# Patient Record
Sex: FEMALE | Race: WHITE | NOT HISPANIC OR LATINO | Employment: FULL TIME | RURAL
[De-identification: names, ages, dates, MRNs, and addresses within clinical notes are randomized per-mention and may not be internally consistent; named-entity substitution may affect disease eponyms.]

---

## 2023-05-04 ENCOUNTER — LAB (OUTPATIENT)
Dept: LAB | Facility: HOSPITAL | Age: 33
End: 2023-05-04
Payer: COMMERCIAL

## 2023-05-04 ENCOUNTER — OFFICE VISIT (OUTPATIENT)
Dept: FAMILY MEDICINE CLINIC | Facility: CLINIC | Age: 33
End: 2023-05-04
Payer: COMMERCIAL

## 2023-05-04 VITALS
WEIGHT: 102 LBS | HEIGHT: 63 IN | TEMPERATURE: 98.6 F | OXYGEN SATURATION: 98 % | HEART RATE: 75 BPM | BODY MASS INDEX: 18.07 KG/M2 | SYSTOLIC BLOOD PRESSURE: 124 MMHG | DIASTOLIC BLOOD PRESSURE: 72 MMHG

## 2023-05-04 DIAGNOSIS — Z11.59 NEED FOR HEPATITIS C SCREENING TEST: ICD-10-CM

## 2023-05-04 DIAGNOSIS — Z13.220 SCREENING FOR HYPERLIPIDEMIA: ICD-10-CM

## 2023-05-04 DIAGNOSIS — Z00.00 ENCOUNTER FOR MEDICAL EXAMINATION TO ESTABLISH CARE: ICD-10-CM

## 2023-05-04 DIAGNOSIS — Z13.1 SCREENING FOR DIABETES MELLITUS: ICD-10-CM

## 2023-05-04 DIAGNOSIS — Z13.6 SCREENING FOR CARDIOVASCULAR CONDITION: ICD-10-CM

## 2023-05-04 DIAGNOSIS — Z13.21 ENCOUNTER FOR VITAMIN DEFICIENCY SCREENING: ICD-10-CM

## 2023-05-04 DIAGNOSIS — L70.9 ACNE, UNSPECIFIED ACNE TYPE: Primary | ICD-10-CM

## 2023-05-04 DIAGNOSIS — Z13.29 SCREENING FOR THYROID DISORDER: ICD-10-CM

## 2023-05-04 PROCEDURE — 80061 LIPID PANEL: CPT

## 2023-05-04 PROCEDURE — 80050 GENERAL HEALTH PANEL: CPT

## 2023-05-04 PROCEDURE — 83036 HEMOGLOBIN GLYCOSYLATED A1C: CPT

## 2023-05-04 PROCEDURE — 86803 HEPATITIS C AB TEST: CPT

## 2023-05-04 PROCEDURE — 82306 VITAMIN D 25 HYDROXY: CPT

## 2023-05-04 RX ORDER — SPIRONOLACTONE 25 MG/1
25 TABLET ORAL DAILY
Qty: 90 TABLET | Refills: 3 | Status: SHIPPED | OUTPATIENT
Start: 2023-05-04

## 2023-05-04 RX ORDER — SPIRONOLACTONE 25 MG/1
25 TABLET ORAL DAILY
COMMUNITY
End: 2023-05-04 | Stop reason: SDUPTHER

## 2023-05-04 NOTE — PROGRESS NOTES
"Chief Complaint  Establish Care    Subjective          Rosanna Flynn presents to [facility] [place]    History reviewed. No pertinent past medical history.   Past Surgical History:   Procedure Laterality Date   • BREAST AUGMENTATION Bilateral    • WISDOM TOOTH EXTRACTION          Current Outpatient Medications   Medication Instructions   • spironolactone (ALDACTONE) 25 mg, Oral, Daily      No Known Allergies     History of Present Illness  33-year-old female presents office to establish care.  Patient reports no recent previous PCP.  Last preventative care was received with  care around 7 years ago with birth of child's when living in [place], [place].  Patient reports no recent Pap.  Denies IUD use, denies any abnormal Paps in the past. The following portions of the patient's history were reviewed and updated as appropriate: allergies, current medications, past family history, past medical history, past social history, past surgical history and problem list.      Review of Systems   Constitutional: Negative.  Negative for chills and fever.   HENT: Negative.    Eyes: Negative.    Respiratory: Negative.  Negative for shortness of breath.    Cardiovascular: Negative.  Negative for chest pain, palpitations and leg swelling.   Gastrointestinal: Negative.  Negative for constipation and diarrhea.   Endocrine: Negative.    Musculoskeletal: Negative.    Skin: Negative.    Neurological: Negative.    Psychiatric/Behavioral: Negative.         Objective   Vital Signs:   /72 (BP Location: Right arm, Patient Position: Sitting, Cuff Size: Adult)   Pulse 75 Comment: apical  Temp 98.6 °F (37 °C)   Ht 160 cm (63\")   Wt 46.3 kg (102 lb)   SpO2 98%   BMI 18.07 kg/m²       Physical Exam  Vitals reviewed.   Constitutional:       General: She is not in acute distress.     Appearance: Normal appearance. She is not ill-appearing.   HENT:      Head: " Normocephalic and atraumatic.      Right Ear: External ear normal.      Left Ear: External ear normal.      Nose: Nose normal.   Eyes:      General:         Right eye: No discharge.         Left eye: No discharge.   Cardiovascular:      Rate and Rhythm: Normal rate and regular rhythm.      Heart sounds: Normal heart sounds.   Pulmonary:      Effort: Pulmonary effort is normal. No respiratory distress.      Breath sounds: Normal breath sounds. No stridor. No wheezing, rhonchi or rales.   Abdominal:      General: There is no distension.      Palpations: Abdomen is soft.   Musculoskeletal:         General: Normal range of motion.      Cervical back: Normal range of motion and neck supple.      Right lower leg: No edema.      Left lower leg: No edema.   Skin:     General: Skin is warm and dry.   Neurological:      General: No focal deficit present.      Mental Status: She is alert and oriented to person, place, and time. Mental status is at baseline.      Gait: Gait normal.   Psychiatric:         Mood and Affect: Mood normal.         Behavior: Behavior normal.         Thought Content: Thought content normal.         Judgment: Judgment normal.          Result Review :   The following data was reviewed by: ALLISON Silva on 05/04/2023:  Labs ordered this visit.         Assessment and Plan    Diagnoses and all orders for this visit:    1. Acne, unspecified acne type (Primary)  -     spironolactone (ALDACTONE) 25 MG tablet; Take 1 tablet by mouth Daily.  Dispense: 90 tablet; Refill: 3    2. Encounter for medical examination to establish care    3. Need for hepatitis C screening test  -     HCV Antibody Rfx To Qnt PCR  -     Interpretation:    4. Encounter for vitamin deficiency screening  -     Vitamin D,25-Hydroxy    5. Screening for hyperlipidemia  -     Lipid Panel    6. Screening for diabetes mellitus  -     Comprehensive Metabolic Panel  -     Hemoglobin A1c    7. Screening for cardiovascular condition  -      CBC & Differential  -     Comprehensive Metabolic Panel    8. Screening for thyroid disorder  -     TSH Rfx On Abnormal To Free T4      -Screening labs; will notify patient of results as available.  -Prescription for spironolactone for acne.  -Follow up in 2 months for Pap and annual physical.  -Vaccines: covid vaccine deferred at this time.  -Discussed frequent well balanced meals as well as adequate water intake.    -Discussed plan of care with patient.    -Answered all of patient's questions.  -Patient agreeable to plan of care.    EMR Dragon/Transcription Disclaimer: Some of this note may be an electronic transcription/translation of spoken language to printed text.  The electronic translation of spoken language may permit erroneous, or at times, nonsensical words or phrases to be inadvertently transcribed. Although I have reviewed the note for such errors, some may still exist.     Follow Up   Return in about 2 months (around 7/4/2023) for Annual physical & pap.  Patient was given instructions and counseling regarding her condition or for health maintenance advice. Please see specific information pulled into the AVS if appropriate.       This document has been electronically signed by ALLISON Silva on May 19, 2023 00:04 CDT,.

## 2023-05-05 LAB
25(OH)D3 SERPL-MCNC: 61.1 NG/ML (ref 30–100)
ALBUMIN SERPL-MCNC: 4.7 G/DL (ref 3.5–5.2)
ALBUMIN/GLOB SERPL: 2 G/DL
ALP SERPL-CCNC: 18 U/L (ref 39–117)
ALT SERPL W P-5'-P-CCNC: 11 U/L (ref 1–33)
ANION GAP SERPL CALCULATED.3IONS-SCNC: 13 MMOL/L (ref 5–15)
AST SERPL-CCNC: 20 U/L (ref 1–32)
BASOPHILS # BLD AUTO: 0.07 10*3/MM3 (ref 0–0.2)
BASOPHILS NFR BLD AUTO: 1.1 % (ref 0–1.5)
BILIRUB SERPL-MCNC: 0.5 MG/DL (ref 0–1.2)
BUN SERPL-MCNC: 15 MG/DL (ref 6–20)
BUN/CREAT SERPL: 20 (ref 7–25)
CALCIUM SPEC-SCNC: 9.1 MG/DL (ref 8.6–10.5)
CHLORIDE SERPL-SCNC: 102 MMOL/L (ref 98–107)
CHOLEST SERPL-MCNC: 183 MG/DL (ref 0–200)
CO2 SERPL-SCNC: 24 MMOL/L (ref 22–29)
CREAT SERPL-MCNC: 0.75 MG/DL (ref 0.57–1)
DEPRECATED RDW RBC AUTO: 41.9 FL (ref 37–54)
EGFRCR SERPLBLD CKD-EPI 2021: 107.3 ML/MIN/1.73
EOSINOPHIL # BLD AUTO: 0.01 10*3/MM3 (ref 0–0.4)
EOSINOPHIL NFR BLD AUTO: 0.2 % (ref 0.3–6.2)
ERYTHROCYTE [DISTWIDTH] IN BLOOD BY AUTOMATED COUNT: 13.9 % (ref 12.3–15.4)
GLOBULIN UR ELPH-MCNC: 2.3 GM/DL
GLUCOSE SERPL-MCNC: 76 MG/DL (ref 65–99)
HBA1C MFR BLD: 4.8 % (ref 4.8–5.6)
HCT VFR BLD AUTO: 29.1 % (ref 34–46.6)
HDLC SERPL-MCNC: 73 MG/DL (ref 40–60)
HGB BLD-MCNC: 9.9 G/DL (ref 12–15.9)
IMM GRANULOCYTES # BLD AUTO: 0.01 10*3/MM3 (ref 0–0.05)
IMM GRANULOCYTES NFR BLD AUTO: 0.2 % (ref 0–0.5)
LDLC SERPL CALC-MCNC: 94 MG/DL (ref 0–100)
LDLC/HDLC SERPL: 1.26 {RATIO}
LYMPHOCYTES # BLD AUTO: 1.62 10*3/MM3 (ref 0.7–3.1)
LYMPHOCYTES NFR BLD AUTO: 24.4 % (ref 19.6–45.3)
MCH RBC QN AUTO: 28.5 PG (ref 26.6–33)
MCHC RBC AUTO-ENTMCNC: 34 G/DL (ref 31.5–35.7)
MCV RBC AUTO: 83.9 FL (ref 79–97)
MONOCYTES # BLD AUTO: 0.56 10*3/MM3 (ref 0.1–0.9)
MONOCYTES NFR BLD AUTO: 8.4 % (ref 5–12)
NEUTROPHILS NFR BLD AUTO: 4.36 10*3/MM3 (ref 1.7–7)
NEUTROPHILS NFR BLD AUTO: 65.7 % (ref 42.7–76)
NRBC BLD AUTO-RTO: 0 /100 WBC (ref 0–0.2)
PLATELET # BLD AUTO: 329 10*3/MM3 (ref 140–450)
PMV BLD AUTO: 10.8 FL (ref 6–12)
POTASSIUM SERPL-SCNC: 3.9 MMOL/L (ref 3.5–5.2)
PROT SERPL-MCNC: 7 G/DL (ref 6–8.5)
RBC # BLD AUTO: 3.47 10*6/MM3 (ref 3.77–5.28)
SODIUM SERPL-SCNC: 139 MMOL/L (ref 136–145)
TRIGL SERPL-MCNC: 89 MG/DL (ref 0–150)
TSH SERPL DL<=0.05 MIU/L-ACNC: 0.94 UIU/ML (ref 0.27–4.2)
VLDLC SERPL-MCNC: 16 MG/DL (ref 5–40)
WBC NRBC COR # BLD: 6.63 10*3/MM3 (ref 3.4–10.8)

## 2023-05-06 LAB
HCV AB SERPL QL IA: NORMAL
HCV IGG SERPL QL IA: NON REACTIVE

## 2023-05-07 NOTE — PROGRESS NOTES
Rupert Marte,    Your lab results are as follows:    Metabolic panel (kidney function, liver function, and electrolytes): Normal    Cholesterol normal except for good cholesterol is slightly elevated, however, of no concern at this time.    Negative hep C screening    Vitamin D normal    Thyroid function normal    A1c normal: No concern for diabetes at this time.    If you have questions or concerns please send [employer] message or call office.    Thanks,  ALLISON Bryant

## 2023-05-08 DIAGNOSIS — D64.9 LOW HEMOGLOBIN: Primary | ICD-10-CM

## 2023-05-11 ENCOUNTER — LAB (OUTPATIENT)
Dept: LAB | Facility: HOSPITAL | Age: 33
End: 2023-05-11
Payer: COMMERCIAL

## 2023-05-11 DIAGNOSIS — D64.9 LOW HEMOGLOBIN: ICD-10-CM

## 2023-05-11 LAB
FERRITIN SERPL-MCNC: 8.34 NG/ML (ref 13–150)
FOLATE SERPL-MCNC: 11.1 NG/ML (ref 4.78–24.2)
IRON 24H UR-MRATE: 45 MCG/DL (ref 37–145)
IRON SATN MFR SERPL: 9 % (ref 20–50)
TIBC SERPL-MCNC: 477 MCG/DL (ref 298–536)
TRANSFERRIN SERPL-MCNC: 320 MG/DL (ref 200–360)
VIT B12 BLD-MCNC: 578 PG/ML (ref 211–946)

## 2023-05-11 PROCEDURE — 82728 ASSAY OF FERRITIN: CPT

## 2023-05-11 PROCEDURE — 82746 ASSAY OF FOLIC ACID SERUM: CPT

## 2023-05-11 PROCEDURE — 83540 ASSAY OF IRON: CPT

## 2023-05-11 PROCEDURE — 82607 VITAMIN B-12: CPT

## 2023-05-11 PROCEDURE — 84466 ASSAY OF TRANSFERRIN: CPT

## 2023-05-12 DIAGNOSIS — D50.8 IRON DEFICIENCY ANEMIA SECONDARY TO INADEQUATE DIETARY IRON INTAKE: ICD-10-CM

## 2023-05-12 DIAGNOSIS — D64.9 LOW HEMOGLOBIN: Primary | ICD-10-CM

## 2023-05-12 RX ORDER — FERROUS SULFATE 325(65) MG
325 TABLET ORAL
Qty: 30 TABLET | Refills: 1 | Status: CANCELLED | OUTPATIENT
Start: 2023-05-12

## 2023-05-12 NOTE — PROGRESS NOTES
Low ferritin and iron saturation.  Lower limits iron result and upper limits TIBC. Due to accompaniment of low hemoglobin I would recommend iron supplements daily.  [employer] message sent to patient.

## 2023-07-05 ENCOUNTER — OFFICE VISIT (OUTPATIENT)
Dept: FAMILY MEDICINE CLINIC | Facility: CLINIC | Age: 33
End: 2023-07-05
Payer: COMMERCIAL

## 2023-07-05 VITALS
WEIGHT: 105 LBS | SYSTOLIC BLOOD PRESSURE: 118 MMHG | HEART RATE: 90 BPM | DIASTOLIC BLOOD PRESSURE: 72 MMHG | BODY MASS INDEX: 18.61 KG/M2 | HEIGHT: 63 IN | TEMPERATURE: 98.4 F | OXYGEN SATURATION: 100 %

## 2023-07-05 DIAGNOSIS — Z12.4 CERVICAL CANCER SCREENING: Primary | ICD-10-CM

## 2023-07-05 RX ORDER — FERROUS SULFATE 325(65) MG
325 TABLET ORAL
COMMUNITY

## 2023-07-05 NOTE — PATIENT INSTRUCTIONS
Monthly breast exams.  Pap Smear/HPV screening today.  Follow up as needed of in 10 months for yearly labs.

## 2023-07-05 NOTE — PROGRESS NOTES
"Chief Complaint  Follow-up on low iron and annual physical with pap    Subjective          Rosanna Flynn presents to [facility] [place]    History reviewed. No pertinent past medical history.   Past Surgical History:   Procedure Laterality Date    BREAST AUGMENTATION Bilateral 2013    WISDOM TOOTH EXTRACTION          Current Outpatient Medications   Medication Instructions    ferrous sulfate 325 mg, Oral, Daily With Breakfast    spironolactone (ALDACTONE) 25 mg, Oral, Daily      No Known Allergies     History of Present Illness  Rosanna Flynn presents for well woman's exam.  Also following up on iron deficient anemia.  Lab work greatly improved with improved diet and OTC iron supplement. Patient continues spironolactone with out s/e.    Review of Systems   Constitutional: Negative.  Negative for fatigue.   HENT: Negative.     Eyes: Negative.    Respiratory: Negative.  Negative for shortness of breath.    Cardiovascular: Negative.  Negative for chest pain, palpitations and leg swelling.   Gastrointestinal: Negative.  Negative for blood in stool.   Endocrine: Negative.    Musculoskeletal: Negative.    Skin: Negative.    Neurological: Negative.    Psychiatric/Behavioral: Negative.        Objective   Vital Signs:   /72 (BP Location: Left arm, Patient Position: Sitting, Cuff Size: Adult)   Pulse 90   Temp 98.4 øF (36.9 øC)   Ht 160 cm (63\")   Wt 47.6 kg (105 lb)   SpO2 100%   BMI 18.60 kg/mý       Physical Exam  Vitals reviewed.   Constitutional:       General: She is not in acute distress.     Appearance: She is well-developed.   HENT:      Head: Normocephalic and atraumatic.      Right Ear: External ear normal.      Left Ear: External ear normal.      Nose: Nose normal.   Eyes:      Pupils: Pupils are equal, round, and reactive to light.   Neck:      Thyroid: No thyromegaly.   Cardiovascular:      Rate and Rhythm: Normal rate and regular rhythm.    "   Heart sounds: Normal heart sounds. No murmur heard.    No friction rub. No gallop.   Pulmonary:      Effort: Pulmonary effort is normal. No respiratory distress.      Breath sounds: Normal breath sounds. No wheezing or rales.   Chest:   Breasts:     Breasts are symmetrical.      Right: No inverted nipple, mass, nipple discharge, skin change or tenderness.      Left: No inverted nipple, mass, nipple discharge, skin change or tenderness.   Abdominal:      General: Abdomen is flat.      Palpations: Abdomen is soft.      Tenderness: There is no abdominal tenderness.   Genitourinary:     Exam position: Lithotomy position.      Pubic Area: No rash or pubic lice.       Ace stage (genital): 5.      Labia:         Right: No rash, tenderness or lesion.         Left: No rash, tenderness or lesion.       Vagina: Normal. No foreign body. No vaginal discharge, erythema, tenderness or bleeding.      Cervix: Cervical bleeding (end of cycle) present. No cervical motion tenderness, discharge, friability, lesion, erythema or eversion.      Uterus: Deviated (right). Not enlarged, not tender and no uterine prolapse.       Adnexa:         Right: No mass, tenderness or fullness.          Left: No mass, tenderness or fullness.        Rectum: Normal.      Comments: Retroverted uterus  Musculoskeletal:         General: Normal range of motion.      Cervical back: Normal range of motion and neck supple.      Right lower leg: No edema.      Left lower leg: No edema.   Skin:     General: Skin is warm and dry.      Findings: No erythema or rash.   Psychiatric:         Behavior: Behavior normal.         Thought Content: Thought content normal.         Judgment: Judgment normal.      Result Review :   The following data was reviewed by: ALLISON Silva:     Results    Collected Updated Procedure    07/05/2023 1700 07/05/2023 1700 HPV DNA Probe, [facility]) - , Cervix, Endocervix [[id number]]   Cervix, Endocervix    Component  Value   No component results          2023 1700 2023 1524 LIQUID-BASED PAP SMEAR, [facility] ([facility]) [[id number]]   ThinPrep Vial from Cervix    Component Value   Reference Lab Report [facility]  [address] [place] [address]  Phone: [phone] or [phone]  Fax: [phone]  Oneil Cardenas M.D., Medical Director    PATIENT NAME                                     LABORATORY NO.  [id number]   YULI PAULA                           [id number]  [id number]                                 AGE                    SEX   SSN              CLIENT REF #  [facility] ([place]           33        1990      F     [id number]      [id number]    [address]                           REQUESTING M.D.           ATTENDING [place] [address]                     BERK[place]  DATE COLLECTED            DATE RECEIVED          DATE REPORTED  2023    ThinPrep Pap with [facility]    DIAGNOSIS:  Negative for intraepithelial lesion or malignancy    Multiple factors can influence accuracy of Pap tests; therefore, screening at  regular intervals is necessary for early cancer detection.    Professional interpretation rendered by Oneil Cardenas M.D., F.C.A.P. at  [facility], [place], [address] [place] [address].  RECOMMENDATION:        Follow up as clinically appropriate  SPECIMEN ADEQUACY:  SATISFACTORY FOR EVALUATION  Transformation zone is absent or insufficient.  Partially obscuring blood and inflammation are present.  Sparse cellularity, minimal number of squamous cells available for evaluation.  SOURCE OF SPECIMEN:       CERVICAL/ENDOCERVICAL  SLIDES:  1  CLINICAL HISTORY:  Cervical cancer screening    HPV  HR-HPV POOL: Negative    The Aptima HPV assay is an in vitro nucleic acid amplification test for the  qualitative detection of E6/E7 viral messenger RNA from  14 high risk types of  HPV in cervical specimens. The high risk HPV types detected include: 16, 18,  31, 33, 35, 39, 45, 51, 52, 56, 58, 59, 66, 68    CYTOTECHNOLOGIST:             NETO GONZALEZ (ASCP)                                      REVIEWED, DIAGNOSED AND  ELECTRONICALLY SIGNED BY:      Oneil Cardenas M.D., F.C.A.P.    CPT CODES:  75506, 50125, 12540          06/20/2023 1156 06/21/2023 2005 Iron Profile [[id number]]   Blood    Component Value Units   Iron 138 mcg/dL   Iron Saturation (TSAT) 37 %   Transferrin 250 mg/dL   TIBC 373 mcg/dL          06/20/2023 1156 06/21/2023 2008 Ferritin [[id number]]    Blood    Component Value Units   Ferritin 62.20 ng/mL          06/20/2023 1156 06/21/2023 1918 CBC No Differential [[id number]]   (Abnormal)   Blood    Component Value Units   WBC 4.29 10*3/mm3   RBC 4.06 10*6/mm3   Hemoglobin 12.6 g/dL   Hematocrit 37.1 %   MCV 91.4 fL   MCH 31.0 pg   MCHC 34.0 g/dL   RDW 16.4 High  %   RDW-SD 54.9 High  fl   MPV 11.1 fL   Platelets 308 10*3/mm3          05/11/2023 1016 05/11/2023 1932 Iron Profile [[id number]]   (Abnormal)   Blood    Component Value Units   Iron 45 mcg/dL   Iron Saturation (TSAT) 9 Low  %   Transferrin 320 mg/dL   TIBC 477 mcg/dL          05/11/2023 1016 05/11/2023 1954 Ferritin [[id number]]    (Abnormal)   Blood    Component Value Units   Ferritin 8.34 Low  ng/mL          05/11/2023 1016 05/11/2023 1954 Folate [[id number]]    Blood    Component Value Units   Folate 11.10 ng/mL          05/11/2023 1016 05/11/2023 1954 Vitamin B12 [[id number]]    Blood    Component Value Units   Vitamin B-12 578 pg/mL            Occult Blood X 3, Stool - Stool, Per Rectum [[id number]]   Stool from Per Rectum    Component Value   No component results          05/04/2023 1340 05/06/2023 0814 Interpretation: [[id number]]    Blood    Component Value   Interpretation Comment           05/04/2023 1340 05/05/2023 1930 Vitamin D,25-Hydroxy [[id number]]    Blood    Component Value Units   25 Hydroxy, Vitamin  D 61.1 ng/ml          05/04/2023 1340 05/05/2023 1920 Lipid Panel [[id number]]    (Abnormal)   Blood    Component Value Units   Total Cholesterol 183 mg/dL   Triglycerides 89 mg/dL   HDL Cholesterol 73 High  mg/dL   LDL Cholesterol 94 mg/dL   VLDL Cholesterol 16 mg/dL   LDL/HDL Ratio 1.26           05/04/2023 1340 05/05/2023 1905 Hemoglobin A1c [[id number]]    Blood    Component Value Units   Hemoglobin A1C 4.80 %          05/04/2023 1340 05/05/2023 1920 Comprehensive Metabolic Panel [[id number]]    (Abnormal)   Blood    Component Value Units   Glucose 76 mg/dL   BUN 15 mg/dL   Creatinine 0.75 mg/dL   Sodium 139 mmol/L   Potassium 3.9 mmol/L   Chloride 102 mmol/L   CO2 24.0 mmol/L   Calcium 9.1 mg/dL   Total Protein 7.0 g/dL   Albumin 4.7 g/dL   ALT (SGPT) 11 U/L   AST (SGOT) 20 U/L   Alkaline Phosphatase 18 Low  U/L   Total Bilirubin 0.5 mg/dL   Globulin 2.3 gm/dL   A/G Ratio 2.0 g/dL   BUN/Creatinine Ratio 20.0    Anion Gap 13.0 mmol/L   eGFR 107.3 mL/min/1.73          05/04/2023 1340 05/05/2023 1843 CBC & Differential [[id number]]    (Abnormal)   Blood    Component Value   No component results          05/04/2023 1340 05/05/2023 1920 TSH Rfx On Abnormal To Free T4 [[id number]]   Blood    Component Value Units   TSH 0.944 uIU/mL          05/04/2023 1340 05/06/2023 0814 HCV Antibody Rfx To Qnt PCR [[id number]]    Blood    Component Value   Hepatitis C Ab Non Reactive                      Assessment and Plan    Diagnoses and all orders for this visit:    1. Cervical cancer screening (Primary)  -     LIQUID-BASED PAP SMEAR, [facility] ([facility])  -     HPV DNA Probe, [facility]) - , Cervix, Endocervix    Bimanual exam not performed due to low efficacy.   Recommend monthly breast exams.  Pap sample and HPV sample today.  Continue iron daily.   Continue spironlactone.  Recheck as needed.  Annual physical.    -Discussed plan of care with patient.    -Answered all of patient's questions.  -Patient agreeable to plan of  care.    EMR Dragon/Transcription Disclaimer: Some of this note may be an electronic transcription/translation of spoken language to printed text.  The electronic translation of spoken language may permit erroneous, or at times, nonsensical words or phrases to be inadvertently transcribed. Although I have reviewed the note for such errors, some may still exist.       Follow Up   Return in about 10 months (around 5/5/2024) for Annual physical.  Patient was given instructions and counseling regarding her condition or for health maintenance advice. Please see specific information pulled into the AVS if appropriate.       This document has been electronically signed by ALLISON Silva on August 7, 2023 01:07 CDT,.

## 2023-07-11 LAB — REF LAB TEST METHOD: NORMAL

## 2023-07-11 NOTE — PROGRESS NOTES
Negative for intraepithelial lesion or malignancy.    HPV test pending; will send results as available.